# Patient Record
Sex: FEMALE | Race: WHITE
[De-identification: names, ages, dates, MRNs, and addresses within clinical notes are randomized per-mention and may not be internally consistent; named-entity substitution may affect disease eponyms.]

---

## 2018-12-09 ENCOUNTER — HOSPITAL ENCOUNTER (INPATIENT)
Dept: HOSPITAL 26 - MED | Age: 18
Discharge: HOME | DRG: 817 | End: 2018-12-09
Attending: GENERAL PRACTICE | Admitting: GENERAL PRACTICE
Payer: MEDICAID

## 2018-12-09 VITALS — SYSTOLIC BLOOD PRESSURE: 104 MMHG | DIASTOLIC BLOOD PRESSURE: 64 MMHG

## 2018-12-09 VITALS — DIASTOLIC BLOOD PRESSURE: 49 MMHG | SYSTOLIC BLOOD PRESSURE: 92 MMHG

## 2018-12-09 VITALS — BODY MASS INDEX: 21.71 KG/M2 | WEIGHT: 115 LBS | HEIGHT: 61 IN

## 2018-12-09 VITALS — SYSTOLIC BLOOD PRESSURE: 122 MMHG | DIASTOLIC BLOOD PRESSURE: 89 MMHG

## 2018-12-09 VITALS — SYSTOLIC BLOOD PRESSURE: 99 MMHG | DIASTOLIC BLOOD PRESSURE: 47 MMHG
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
## 2018-12-09 DIAGNOSIS — T39.1X2A: Primary | ICD-10-CM

## 2018-12-09 DIAGNOSIS — F10.129: ICD-10-CM

## 2018-12-09 DIAGNOSIS — A41.9: ICD-10-CM
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
## 2018-12-09 DIAGNOSIS — F32.9: ICD-10-CM

## 2018-12-09 DIAGNOSIS — Z23: ICD-10-CM

## 2018-12-09 DIAGNOSIS — N39.0: ICD-10-CM

## 2018-12-09 DIAGNOSIS — E87.6: ICD-10-CM

## 2018-12-09 DIAGNOSIS — D72.829: ICD-10-CM

## 2018-12-09 LAB
ALBUMIN FLD-MCNC: 3.1 G/DL (ref 3.4–5)
ALBUMIN FLD-MCNC: 4.1 G/DL (ref 3.4–5)
ANION GAP SERPL CALCULATED.3IONS-SCNC: 13.1 MMOL/L (ref 8–16)
ANION GAP SERPL CALCULATED.3IONS-SCNC: 14.4 MMOL/L (ref 8–16)
ANION GAP SERPL CALCULATED.3IONS-SCNC: 17 MMOL/L (ref 8–16)
APAP SERPL-MCNC: 0.8 UG/ML (ref 10–30)
APAP SERPL-MCNC: < 0.5 UG/ML (ref 10–30)
APPEARANCE UR: CLEAR
AST SERPL-CCNC: 18 U/L (ref 15–37)
AST SERPL-CCNC: 19 U/L (ref 15–37)
BARBITURATES UR QL SCN: (no result) NG/ML
BASOPHILS # BLD AUTO: 0 K/UL (ref 0–0.22)
BASOPHILS # BLD AUTO: 0.1 K/UL (ref 0–0.22)
BASOPHILS NFR BLD AUTO: 0.4 % (ref 0–2)
BASOPHILS NFR BLD AUTO: 0.7 % (ref 0–2)
BENZODIAZ UR QL SCN: (no result) NG/ML
BILIRUB DIRECT SERPL-MCNC: 0 MG/DL (ref 0–0.3)
BILIRUB SERPL-MCNC: 0.1 MG/DL (ref 0–1)
BILIRUB SERPL-MCNC: 0.2 MG/DL (ref 0–1)
BILIRUB UR QL STRIP: NEGATIVE
BUN SERPL-MCNC: 2 MG/DL (ref 7–18)
BUN SERPL-MCNC: 3 MG/DL (ref 7–18)
BUN SERPL-MCNC: 4 MG/DL (ref 7–18)
BZE UR QL SCN: (no result) NG/ML
CANNABINOIDS UR QL SCN: (no result) NG/ML
CHLORIDE SERPL-SCNC: 105 MMOL/L (ref 98–107)
CHLORIDE SERPL-SCNC: 107 MMOL/L (ref 98–107)
CHLORIDE SERPL-SCNC: 111 MMOL/L (ref 98–107)
CHOLEST/HDLC SERPL: 2 {RATIO} (ref 1–4.5)
CO2 SERPL-SCNC: 21 MMOL/L (ref 21–32)
CO2 SERPL-SCNC: 24.6 MMOL/L (ref 21–32)
CO2 SERPL-SCNC: 25.3 MMOL/L (ref 21–32)
COLOR UR: YELLOW
CREAT SERPL-MCNC: 0.5 MG/DL (ref 0.6–1.3)
CREAT SERPL-MCNC: 0.5 MG/DL (ref 0.6–1.3)
CREAT SERPL-MCNC: 0.6 MG/DL (ref 0.6–1.3)
EOSINOPHIL # BLD AUTO: 0.1 K/UL (ref 0–0.4)
EOSINOPHIL # BLD AUTO: 0.1 K/UL (ref 0–0.4)
EOSINOPHIL NFR BLD AUTO: 0.5 % (ref 0–4)
EOSINOPHIL NFR BLD AUTO: 0.9 % (ref 0–4)
ERYTHROCYTE [DISTWIDTH] IN BLOOD BY AUTOMATED COUNT: 15.6 % (ref 11.6–13.7)
ERYTHROCYTE [DISTWIDTH] IN BLOOD BY AUTOMATED COUNT: 15.9 % (ref 11.6–13.7)
GFR SERPL CREATININE-BSD FRML MDRD: 164 ML/MIN (ref 90–?)
GFR SERPL CREATININE-BSD FRML MDRD: 202 ML/MIN (ref 90–?)
GFR SERPL CREATININE-BSD FRML MDRD: 202 ML/MIN (ref 90–?)
GLUCOSE SERPL-MCNC: 84 MG/DL (ref 74–106)
GLUCOSE SERPL-MCNC: 88 MG/DL (ref 74–106)
GLUCOSE SERPL-MCNC: 92 MG/DL (ref 74–106)
GLUCOSE UR STRIP-MCNC: NEGATIVE MG/DL
HCT VFR BLD AUTO: 37.8 % (ref 36–48)
HCT VFR BLD AUTO: 39.4 % (ref 36–48)
HDLC SERPL-MCNC: 66 MG/DL (ref 40–60)
HGB BLD-MCNC: 12.3 G/DL (ref 12–16)
HGB BLD-MCNC: 12.7 G/DL (ref 12–16)
HGB UR QL STRIP: NEGATIVE
LDLC SERPL CALC-MCNC: 63 MG/DL (ref 60–100)
LEUKOCYTE ESTERASE UR QL STRIP: (no result)
LIPASE SERPL-CCNC: 138 U/L (ref 73–393)
LYMPHOCYTES # BLD AUTO: 2.6 K/UL (ref 2.5–16.5)
LYMPHOCYTES # BLD AUTO: 2.7 K/UL (ref 2.5–16.5)
LYMPHOCYTES NFR BLD AUTO: 20.9 % (ref 20.5–51.1)
LYMPHOCYTES NFR BLD AUTO: 28.8 % (ref 20.5–51.1)
MAGNESIUM SERPL-MCNC: 1.9 MG/DL (ref 1.8–2.4)
MCH RBC QN AUTO: 28 PG (ref 27–31)
MCH RBC QN AUTO: 28 PG (ref 27–31)
MCHC RBC AUTO-ENTMCNC: 32 G/DL (ref 33–37)
MCHC RBC AUTO-ENTMCNC: 33 G/DL (ref 33–37)
MCV RBC AUTO: 86.1 FL (ref 80–94)
MCV RBC AUTO: 86.1 FL (ref 80–94)
MONOCYTES # BLD AUTO: 0.8 K/UL (ref 0.8–1)
MONOCYTES # BLD AUTO: 0.9 K/UL (ref 0.8–1)
MONOCYTES NFR BLD AUTO: 6.9 % (ref 1.7–9.3)
MONOCYTES NFR BLD AUTO: 9 % (ref 1.7–9.3)
NEUTROPHILS # BLD AUTO: 5.6 K/UL (ref 1.8–7.7)
NEUTROPHILS # BLD AUTO: 8.8 K/UL (ref 1.8–7.7)
NEUTROPHILS NFR BLD AUTO: 60.6 % (ref 42.2–75.2)
NEUTROPHILS NFR BLD AUTO: 71.3 % (ref 42.2–75.2)
NITRITE UR QL STRIP: NEGATIVE
OPIATES UR QL SCN: (no result) NG/ML
PCP UR QL SCN: (no result) NG/ML
PH UR STRIP: 6.5 [PH] (ref 5–9)
PHOSPHATE SERPL-MCNC: 2.8 MG/DL (ref 2.5–4.9)
PLATELET # BLD AUTO: 339 K/UL (ref 140–450)
PLATELET # BLD AUTO: 361 K/UL (ref 140–450)
POTASSIUM SERPL-SCNC: 3.3 MMOL/L (ref 3.5–5.1)
POTASSIUM SERPL-SCNC: 3.4 MMOL/L (ref 3.5–5.1)
POTASSIUM SERPL-SCNC: 3.7 MMOL/L (ref 3.5–5.1)
PROTHROMBIN TIME: 10.4 SECS (ref 10.8–13.4)
RBC # BLD AUTO: 4.39 MIL/UL (ref 4.2–5.4)
RBC # BLD AUTO: 4.58 MIL/UL (ref 4.2–5.4)
RBC #/AREA URNS HPF: (no result) /HPF (ref 0–5)
SALICYLATES SERPL-MCNC: < 2.8 MG/DL (ref 2.8–20)
SODIUM SERPL-SCNC: 141 MMOL/L (ref 136–145)
SODIUM SERPL-SCNC: 143 MMOL/L (ref 136–145)
SODIUM SERPL-SCNC: 144 MMOL/L (ref 136–145)
TRIGL SERPL-MCNC: 8 MG/DL (ref 30–150)
TSH SERPL DL<=0.05 MIU/L-ACNC: 1.57 UIU/ML (ref 0.34–3.74)
WBC # BLD AUTO: 12.4 K/UL (ref 4.5–11)
WBC # BLD AUTO: 9.2 K/UL (ref 4.5–11)
WBC,URINE: (no result) /HPF (ref 0–5)

## 2018-12-09 PROCEDURE — G0480 DRUG TEST DEF 1-7 CLASSES: HCPCS

## 2018-12-09 PROCEDURE — G0482 DRUG TEST DEF 15-21 CLASSES: HCPCS

## 2018-12-09 PROCEDURE — C1758 CATHETER, URETERAL: HCPCS

## 2018-12-09 NOTE — NUR
PATIENT CLEARED FROM 5150 HOLD PER DR. LEONARDO LOFTON. INFORMED DR. WEILE. WAITING FOR 
ACETAMINOPHEN LAB RESULTS.

## 2018-12-09 NOTE — NUR
PT DISROBED COMPLETELY AND ALL CLOTHING HANDED OVER TO SECURITY ALONG WITH 
PERSONAL BELONGINGS FOR SAFEKEEPING. BEDSIDE EQUIPMENT AND OTHER CABLES REMOVED 
FROM ROOM WITH ALL CABINETS AND CONTAINERS LOCKED AND SECURED. SITTER PLACED AT 
BEDSIDE FOR MONITORING.

## 2018-12-09 NOTE — NUR
JACK FROM POISON CONTROL CALLED ASKING FOR UPDATE ABOUT PATIENT. INFORMED HIM OF PATIENT'S 
INITIAL TYLENOL LEVEL AND FOLLOW UP TEST RESULTS. HE STATED PER POISON CONTROL'S STANDBY 
WITH PATIENT'S STABLE VS, CASE CLOSED FROM THEIR STANDPOINT. RN VERBALIZED UNDERSTANDING.

## 2018-12-09 NOTE — NUR
# 14 FR Urinary catheter inserted utilizing sterile technique. No return of 
urine at this time.  I/O CATH left in place for urine output. Pt tolerated 
procedure well.

## 2018-12-09 NOTE — NUR
PATIENT MOVED FROM ROOM 111B TO 110A, PATIENT TOLERATED MOVE WELL. ORDERED MEDICATIONS 
GIVEN. PATIENT TOLERATED THEM WELL. PATIENT NOW BACK TO SLEEPING COMFORTABLY IN BED. NO S/S 
OF DISTRESS NOTED. 1:1 SITTER AT BEDSIDE. WILL CONTINUE TO MONITOR PATIENT.

## 2018-12-09 NOTE — NUR
PT ARRIVED ON UNIT VIA [facility] WITH ER NURSE. PT IS AWAKE AND QUIET. PT ABLE TO AMBULATE FROM 
[facility] INTO BED. RESPIRATIONS ARE EVEN AND UNLABORED. MRSA SWAB COLLECTED. VS TAKEN. IV 
ACCESS IN L AC 20G WITH POTASSIUM INFUSING. IV IS PATENT AND INTACT. BEDSIDE EQUIPMENT AND 
OTHER CABLES REMOVED FROM ROOM. . IS AT BEDSIDE SPEAKING WITH PT. PT ANSWERING ALL  
QUESTIONS AND BECOMING EMOTIONAL. BED IS LOCKED, LOW POSITION WITH SIDE RAILS UP X2. SITTER 
IN ROOM. WILL CONTINUE TO MONITOR PT.

## 2018-12-09 NOTE — NUR
RECEIVED REPORT FROM NIGHT SHIFT RN AT BEDSIDE FOR CONTINUITY OF CARE. PATIENT SLEEPING 
COMFORTABLY IN BED, RESPIRATIONS EVEN AND UNLABORED ON ROOM AIR. IV SITE PATENT AND INTACT, 
INFUSING IVF WELL. PATIENT DX IS 5150 FOR SI. SAFETY PRECAUTION IN PLACE, 1:1 SITTER AT 
BEDSIDE, WILL CONTINUE TO MONITOR PATIENT.

## 2018-12-09 NOTE — NUR
POISON CONTROL CALLED SPOKE TO GURINDER. ADVISED TO GIVE ACTIVATED CHARCOAL, LABS- 
CMP/ CBC/UA/UDS/SALICYLATES LEVEL,  MONITOR AND RE-EVAL FOR 6HOURS, IVF, REPEAT 
LABS IN 4 HOUR. MONITOR PT FOR S/S OF METABOLIC ACIDOSIS, SEIZURES, AND RENAL 
INSUFFICIENCY.

## 2018-12-09 NOTE — NUR
CHARGE RN JULIAN CALLED DR LEONARDO LOFTON AND INFORMED HIM THAT DR. FU HAS NOT COME TO 
EVALUATE PATIENT SINCE CONSULT WAS CALLED IN. DR. LOFTON STATED THAT HE WILL COME IN TODAY TO 
SEE THE PATIENT.

## 2018-12-09 NOTE — NUR
CALLED DR. LOFTON'S EXCHANGE. THEY SAID DR. LEONARDO LOFTON ON CALL. CALLED DR. LEONARDO LOFTON ON HIS 
CELL PHONE [phone]. DR. LOFTON ANSWERED, STATED THAT DR. FU COVERS [facility], INFORM 
RN TO CALL DR. FU, IF NO ANSWER, TO CALL HIM BACK. RN VERBALIZED UNDERSTANDING. PASSED 
MESSAGE ONTO CHARGE RNJULIAN.

## 2018-12-09 NOTE — NUR
FLU VACCINE GIVEN. PATIENT TOLERATED IT WELL. TELE MONITOR REMOVED, IV REMOVED, IV CATHETER 
INTACT. ID BANDS CUT. PATIENT TOLERATED THEM WELL. DISCHARGE INSTRUCTIONS AND EDUCATION 
ABOUT SUICIDAL THOUGHTS GIVEN. PATIENT VERBALIZED UNDERSTANDING. PATIENT NOW CHANGED INTO 
HER OWN CLOTHING AND WILL WAIT FOR DISCHARGE.

## 2018-12-09 NOTE — NUR
Patient will be admitted to care of DR AVALOS. Admited to TELE.  Will go to room 
111B. Belongings list completed.  Report to LINO GILBERT.

## 2018-12-09 NOTE — NUR
FOUR ATTEMPTS TO CALL PATIENT'S BOYFRIEND, RANJAN, WHO SHE LIVES WITH, [phone]. NO 
ANSWER, CANNOT LEAVE MESSAGE. PATIENT HAS NOT SPOKEN TO HER FAMILY IN TWO YEARS. DOES NOT 
WANT TO CONTACT THEM. SHE DOES NOT WANT TO CONTACT HER BOYFRIEND TO INFORM HIM THAT SHE IS 
COMING HOME. ,"I JUST WANT TO GO HOME". RN VERBALIZED UNDERSTANDING.

## 2018-12-09 NOTE — NUR
NEW BAG OF IVF STARTED. PT SLEEPING COMFORTABLY IN BED. 1:1 SITTER IN ROOM. NO S/SX OF 
DISTRESS WILL CONTINUE TO MONITOR.

## 2018-12-09 NOTE — NUR
NO S/S OF REACTION NOTED FROM FLU VACCINE. PATIENT AMBULATED OFF FLOOR WITH RN. PATIENT 
DISCHARGED HOME. PATIENT TOOK ALL HER BELONGINGS WITH HER. PATIENT IN STABLE CONDITION.

## 2018-12-09 NOTE — NUR
LAB RESULTS OUT, DR. WEILE INFORMED. NEW DISCHARGE ORDER IN. SECURITY WAS PAGED TO BRING 
PATIENT'S BELONGINGS. WAITING FOR SECURITY.

## 2018-12-09 NOTE — NUR
PT NOW SLEEPING COMFORTABLY IN BED. NO SIGNS OR SYMPTOMS OF DISTRESS. 1:1 SITTER IN ROOM. 
WILL CONTINUE TO MONITOR.

## 2018-12-09 NOTE — NUR
PATIENT SLEEPING IN BED COMFORTABLY, RESPIRATIONS EVEN AND UNLABORED ON ROOM AIR. 1:1 SITTER 
IN PLACE, CALL LIGHT WITHIN REACH. WILL CONTINUE TO MONITOR PATIENT.

## 2018-12-09 NOTE — NUR
PAGED DR[facility] [phone] TO FOLLOW UP WITH CONSULT. SPOKE TO ZACHARIAH AT 
THE EXCHANGE, DR. LOFTON ON CALL FOR THE WEEKEND, PAGED WAS SENT OUT. WILL WAIT FOR DR. LOFTON 
TO COME AND EVALUATE THE PATIENT.

## 2018-12-09 NOTE — NUR
BIBA FOR TYLENOL OVERDOSE. PT ALERT AND ORIENTED ANSWERING APPROPRIATELY 
QUESTIONS.

PT STATES "NOBODY WANTS HER HERE.  HER BABY'S DADDY SAYS SHE'S NO GOOD.  PT 
STATES HERE PARENTS DONT WANT HER.  PT STATING HER BABY'S DADDY TOLD HER SHE 
WAS NOT A GOOD MOTHER AND DOES NOT NEED TO BE AROUND THE BABY."  PT HAS 
ATTEMPTED SUICIDE 2 1/2 YEARS AGO.  

PT DENIES N/V/D; SKIN IS PINK/WARM/DRY; AAOX4 WITH EVEN AND STEADY GAIT; LUNGS 
CLEAR BL; HR EVEN AND REGULAR; PATIENT STATES PAIN OF 0/10 AT THIS TIME; VSS; 
PATIENT POSITIONED FOR COMFORT; HOB ELEVATED; BEDRAILS UP X2; BED DOWN. ER MD 
MADE AWARE OF PT STATUS.

## 2019-04-06 ENCOUNTER — HOSPITAL ENCOUNTER (EMERGENCY)
Dept: HOSPITAL 26 - MED | Age: 19
LOS: 1 days | Discharge: HOME | End: 2019-04-07
Payer: MEDICAID

## 2019-04-06 VITALS — BODY MASS INDEX: 22.68 KG/M2 | HEIGHT: 63 IN | WEIGHT: 128 LBS

## 2019-04-06 VITALS — DIASTOLIC BLOOD PRESSURE: 78 MMHG | SYSTOLIC BLOOD PRESSURE: 128 MMHG

## 2019-04-06 DIAGNOSIS — Z3A.01: ICD-10-CM

## 2019-04-06 DIAGNOSIS — O23.41: Primary | ICD-10-CM

## 2019-04-06 LAB
ANION GAP SERPL CALCULATED.3IONS-SCNC: 12.9 MMOL/L (ref 8–16)
APPEARANCE UR: (no result)
BASOPHILS # BLD AUTO: 0 K/UL (ref 0–0.22)
BASOPHILS NFR BLD AUTO: 0.5 % (ref 0–2)
BILIRUB UR QL STRIP: NEGATIVE
BUN SERPL-MCNC: 10 MG/DL (ref 7–18)
CHLORIDE SERPL-SCNC: 103 MMOL/L (ref 98–107)
CO2 SERPL-SCNC: 26.1 MMOL/L (ref 21–32)
COLOR UR: YELLOW
CREAT SERPL-MCNC: 0.5 MG/DL (ref 0.6–1.3)
EOSINOPHIL # BLD AUTO: 0.1 K/UL (ref 0–0.4)
EOSINOPHIL NFR BLD AUTO: 1.3 % (ref 0–4)
ERYTHROCYTE [DISTWIDTH] IN BLOOD BY AUTOMATED COUNT: 14.5 % (ref 11.6–13.7)
GFR SERPL CREATININE-BSD FRML MDRD: 202 ML/MIN (ref 90–?)
GLUCOSE SERPL-MCNC: 93 MG/DL (ref 74–106)
GLUCOSE UR STRIP-MCNC: NEGATIVE MG/DL
HCT VFR BLD AUTO: 43.6 % (ref 36–48)
HGB BLD-MCNC: 14.8 G/DL (ref 12–16)
HGB UR QL STRIP: NEGATIVE
LEUKOCYTE ESTERASE UR QL STRIP: (no result)
LYMPHOCYTES # BLD AUTO: 3.9 K/UL (ref 2.5–16.5)
LYMPHOCYTES NFR BLD AUTO: 37.8 % (ref 20.5–51.1)
MCH RBC QN AUTO: 30 PG (ref 27–31)
MCHC RBC AUTO-ENTMCNC: 34 G/DL (ref 33–37)
MCV RBC AUTO: 87.4 FL (ref 80–94)
MONOCYTES # BLD AUTO: 0.8 K/UL (ref 0.8–1)
MONOCYTES NFR BLD AUTO: 8 % (ref 1.7–9.3)
NEUTROPHILS # BLD AUTO: 5.4 K/UL (ref 1.8–7.7)
NEUTROPHILS NFR BLD AUTO: 52.4 % (ref 42.2–75.2)
NITRITE UR QL STRIP: NEGATIVE
PH UR STRIP: 6 [PH] (ref 5–9)
PLATELET # BLD AUTO: 364 K/UL (ref 140–450)
POTASSIUM SERPL-SCNC: 4 MMOL/L (ref 3.5–5.1)
RBC # BLD AUTO: 4.99 MIL/UL (ref 4.2–5.4)
RBC #/AREA URNS HPF: (no result) /HPF (ref 0–5)
SODIUM SERPL-SCNC: 138 MMOL/L (ref 136–145)
WBC # BLD AUTO: 10.4 K/UL (ref 4.5–11)
WBC,URINE: (no result) /HPF (ref 0–5)

## 2019-04-06 PROCEDURE — 76801 OB US < 14 WKS SINGLE FETUS: CPT

## 2019-04-06 PROCEDURE — 84702 CHORIONIC GONADOTROPIN TEST: CPT

## 2019-04-06 PROCEDURE — 87086 URINE CULTURE/COLONY COUNT: CPT

## 2019-04-06 PROCEDURE — 36415 COLL VENOUS BLD VENIPUNCTURE: CPT

## 2019-04-06 PROCEDURE — 99284 EMERGENCY DEPT VISIT MOD MDM: CPT

## 2019-04-06 PROCEDURE — 81001 URINALYSIS AUTO W/SCOPE: CPT

## 2019-04-06 PROCEDURE — 81025 URINE PREGNANCY TEST: CPT

## 2019-04-06 PROCEDURE — 85025 COMPLETE CBC W/AUTO DIFF WBC: CPT

## 2019-04-06 PROCEDURE — 86901 BLOOD TYPING SEROLOGIC RH(D): CPT

## 2019-04-06 PROCEDURE — 80048 BASIC METABOLIC PNL TOTAL CA: CPT

## 2019-04-06 PROCEDURE — 86900 BLOOD TYPING SEROLOGIC ABO: CPT

## 2019-04-06 NOTE — NUR
PT C/O 5/10 BURNING PAIN WHEN URINATION AND 5/10 GENERAL ABDOMINAL PAIN X 
TODAY. DENIES N/V/D. ABDOMEN SOFT FLAT NONTENDER, BOWEL SOUNDS PRESENT X 4, 
NORMOACTIVE. LBM TODAY. DENIES CP/SOB/FEVERS/CHILLS.

## 2019-04-07 VITALS — SYSTOLIC BLOOD PRESSURE: 128 MMHG | DIASTOLIC BLOOD PRESSURE: 78 MMHG
